# Patient Record
Sex: FEMALE | Race: WHITE | NOT HISPANIC OR LATINO | URBAN - METROPOLITAN AREA
[De-identification: names, ages, dates, MRNs, and addresses within clinical notes are randomized per-mention and may not be internally consistent; named-entity substitution may affect disease eponyms.]

---

## 2017-01-12 DIAGNOSIS — N92.1 BREAKTHROUGH BLEEDING ON BIRTH CONTROL PILLS: Primary | ICD-10-CM

## 2017-01-12 RX ORDER — LEVONORGESTREL/ETHIN.ESTRADIOL 0.1-0.02MG
1 TABLET ORAL DAILY
Qty: 30 TABLET | Refills: 0 | Status: SHIPPED | OUTPATIENT
Start: 2017-01-12 | End: 2024-01-04

## 2017-01-12 NOTE — TELEPHONE ENCOUNTER
Routing refill request to provider for review/approval because:  Patient needs to be seen because it has been more than 1 year since last office visit.  Please see message below.   Teresa Jimenez RN   [facility] - Triage

## 2017-01-12 NOTE — TELEPHONE ENCOUNTER
I let patient know that her Rx for 30 days is at her pharmacy.  No questions.  Helen Boyce RN- Triage [employer]

## 2017-01-12 NOTE — TELEPHONE ENCOUNTER
30 day supply ordered. Please notify her to find a new PCP that her insurance approves to get further refills.    Aldair Adair MD  [facility], [place]

## 2017-01-12 NOTE — TELEPHONE ENCOUNTER
Left non detailed message for patient to return call.  Teresa Jimenez RN   [facility] - Triage

## 2017-01-12 NOTE — TELEPHONE ENCOUNTER
Patient needs by tomorrow or she will be behind on her pills (pharmacy pended)  States she has changed insurance and can no longer come here  Patient was informed that she needs to be seen   Please call patient when this /or if this can be refilled   [phone]      levonorgestrel-ethinyl estradiol (AVIANE,ALESSE,LESSINA) 0.1-20 MG-MCG per tablet      Last Written Prescription Date: 12/3/15  Last Fill Quantity: 90,  # refills: 3   Last Office Visit with [facility], P or [facility] prescribing provider: 12/3/15      Darlin GARRISON

## 2017-04-12 ENCOUNTER — TELEPHONE (OUTPATIENT)
Dept: FAMILY MEDICINE | Facility: CLINIC | Age: 32
End: 2017-04-12

## 2017-04-12 NOTE — TELEPHONE ENCOUNTER
Pt is past due for fu pap smear  Reminder letter was sent 3/16/17  Called and spoke to patient who stated she has transferred care outside of [place] due to insurance coverage.  End Tracking  Madiha Smith,   Pap Tracking

## 2017-06-17 ENCOUNTER — HEALTH MAINTENANCE LETTER (OUTPATIENT)
Age: 32
End: 2017-06-17

## 2018-06-23 ENCOUNTER — HEALTH MAINTENANCE LETTER (OUTPATIENT)
Age: 33
End: 2018-06-23

## 2019-12-15 ENCOUNTER — HEALTH MAINTENANCE LETTER (OUTPATIENT)
Age: 34
End: 2019-12-15

## 2021-01-15 ENCOUNTER — HEALTH MAINTENANCE LETTER (OUTPATIENT)
Age: 36
End: 2021-01-15

## 2021-09-04 ENCOUNTER — HEALTH MAINTENANCE LETTER (OUTPATIENT)
Age: 36
End: 2021-09-04

## 2022-02-19 ENCOUNTER — HEALTH MAINTENANCE LETTER (OUTPATIENT)
Age: 37
End: 2022-02-19

## 2022-08-08 ENCOUNTER — TRANSFERRED RECORDS (OUTPATIENT)
Dept: HEALTH INFORMATION MANAGEMENT | Facility: CLINIC | Age: 37
End: 2022-08-08

## 2022-08-26 ENCOUNTER — TRANSFERRED RECORDS (OUTPATIENT)
Dept: HEALTH INFORMATION MANAGEMENT | Facility: CLINIC | Age: 37
End: 2022-08-26

## 2022-10-16 ENCOUNTER — HEALTH MAINTENANCE LETTER (OUTPATIENT)
Age: 37
End: 2022-10-16

## 2023-04-01 ENCOUNTER — HEALTH MAINTENANCE LETTER (OUTPATIENT)
Age: 38
End: 2023-04-01

## 2023-05-02 ENCOUNTER — TRANSFERRED RECORDS (OUTPATIENT)
Dept: HEALTH INFORMATION MANAGEMENT | Facility: CLINIC | Age: 38
End: 2023-05-02

## 2023-05-26 ENCOUNTER — TRANSFERRED RECORDS (OUTPATIENT)
Dept: HEALTH INFORMATION MANAGEMENT | Facility: CLINIC | Age: 38
End: 2023-05-26
Payer: COMMERCIAL

## 2023-06-06 NOTE — TELEPHONE ENCOUNTER
*Records requested based on other chart, merge in progress*  RECORDS RECEIVED FROM:    DATE RECEIVED:    NOTES STATUS DETAILS   OFFICE NOTE from referring provider  N/A    OFFICE NOTE from other cardiologists  Care Everywhere Dr. Main 5-26-23 PN   RECORDS from hospital/ED Care Everywhere 5-2-23 PN   MEDICATION LIST Internal    GENERAL CARDIO RECORDS   (ALL APPOINTMENT TYPES)     LABS (CBC,BMP,CMP, TSH) Internal    EKG (STRIPS & REPORTS) In process 5-26-23   MONITORS (STRIPS & REPORTS) In process 8-26-22   ECHOS (IMAGES AND REPORTS) In process 10-26-21   STRESS TESTS (IMAGES AND REPORTS) N/A    cMRI (IMAGES AND REPORTS) N/A    CT/CTA (IMAGES AND REPORTS) N/A      Action 6/6/23 [facility]  Fax #[phone]   Action Taken Requested:    EKG Strips    Ziopatch   5-26-23, 5-2-23, 8-8-22 8-26-22     Sent to scanning 6/13      Action 6/6/23 PN  Fax #[phone]   Action Taken Requested echo 10-26-21    Confirmed echo is in PACS 6/13

## 2023-06-13 NOTE — PROGRESS NOTES
"  Reason for Visit:  Today I have seen Gardenia Pierre for Bradycardia  Consult: No    HPI : Status / Symptoms / Concerns     37 y.o. f seen by [facility] cardiology clinic for low heart rates. Relatively low heart rates at baseline. Sometimes prodrome that may include nausea before she has bradycardia. Unable to perform ADLs during these episodes may even need to lay down. Sometimes feels extra beats during these episodes. Understandably concerned about this. Recent implantable loop recorder insertion for weakness and bradycardia. Here today for second opinion.    Previous summary \"Episodic weakness, left arm tingling and low energy to perform her usual tasks. COVID in 2022. Ziopatch which showed no significant abnormalities: HR  bpm, sinus rate of 65 bpm. Symptoms with sinus rhythm in the 50s to 100s. PVC burden was <1%. On the occasions that she experiences symptoms with low HRs. It will increase if she gets up and walks around but that does not necessarily abolish her symptoms. No lightheadedness or near-syncope but becomes frightened that she may fall if the weakness worsens, and especially because she cares for four young children and is sometimes carrying one of them.\"     Cardiovascular risk factor profile:   (-) HTN, (-) DM, (-) hypercholesterolemia, (-)  prior tobacco use, (-) fam Hx premature CAD.     No family history of sudden cardiac death    Chest Pain:   No  Shortness of Breath:  No  Ankle Swelling:  No  Muscle Aches:  No  Palpitations:   No    Lightheadedness:  As above  Fainting:   No  Medication Issues:  No  Recent Test:  No    Past Medical History:   Diagnosis Date     Adjustment disorder with anxious mood 3/7/2014     Herpes zoster 3/2009, 2012     HPV in female 12/2015    NIL pap, +HR HPV. Co-test 1 yr     Intestinal disaccharidase deficiencies and disaccharide malabsorption 1995    lactose intolerance - not an issue now     Irritable bowel syndrome 1995     Mild intermittent " asthma     exercise related - resolved with weight loss     Other chronic allergic conjunctivitis      Perennial allergic rhinitis      Presence of (intrauterine) contraceptive device 2/2014    Mirena     Recurrent urticaria     allergy related      Past Surgical History:   Procedure Laterality Date     HC TOOTH EXTRACTION W/FORCEP  12/2008    wisdom teeth extracted     INSERT INTRAUTERINE DEVICE  2/2014    Mirena placed     TONSILLECTOMY  12/2007        Other Systems:  Resp - / GI - /MS - /Edison - /Psy - /Derm - /Hem - / - /Lymph - /ENT -/ Endo -  No other pertinent concern in systems review.     Social History: reports that she has never smoked. She has never used smokeless tobacco. She reports current alcohol use. She reports that she does not use drugs.   I have reviewed this patient's social history and updated it with pertinent information if needed.    Family History   Problem Relation Age of Onset     Breast Cancer Maternal Grandmother         at age 78     Hypertension Mother      Osteoarthritis Mother         planning CANDELARIO     Cerebrovascular Disease Paternal Grandmother      Lipids Father      Depression Mother      Lipids Mother      Cervical Cancer Sister      Medications:  Current Outpatient Medications   Medication     albuterol (PROAIR HFA/PROVENTIL HFA/VENTOLIN HFA) 108 (90 Base) MCG/ACT inhaler     loratadine (CLARITIN) 10 MG tablet     multivitamin w/minerals (THERA-VIT-M) tablet     CETIRIZINE HCL 10 MG OR TABS     levonorgestrel-ethinyl estradiol (AVIANE,ALESSE,LESSINA) 0.1-20 MG-MCG per tablet     magnesium 250 MG tablet     No current facility-administered medications for this visit.        Exam:  BP 94/63 (BP Location: Right arm, Patient Position: Chair, Cuff Size: Adult Large)   Pulse 90   Wt 89.4 kg (197 lb 3.2 oz)   SpO2 95%   BMI 27.70 kg/m     Body mass index is 27.7 kg/m .   General:  Alert, oriented, no acute distress  Eyes:  External exam normal, Conjunctivae noninjected and  nonicteric.  Mouth:  Moist mucosa, restored teeth  Ears:  Hearing grossly intact  Neck:  No thyromegaly. Carotid upstroke normal, no carotid bruit, no JVD  Lungs:  Clear to auscultation. No wheezes, crackles, rales or rhonchi,      no accessory muscle use   Heart:  Regular, normal S1 and S2, no obvious murmurs, no rubs or gallops  Abdomen: Soft, non-tender  Extremities: No ankle edema, age appropriate skin without stasis  Pulses: Pedal 2+ bilaterally  Edison/Psy: Non-focal, normal mood, normal affect      Vital Trend:  Wt Readings from Last 5 Encounters:   06/14/23 89.4 kg (197 lb 3.2 oz)   12/03/15 80.7 kg (178 lb)   02/25/15 76.7 kg (169 lb)   09/11/14 78.5 kg (173 lb)   05/13/14 78.9 kg (174 lb)     BP Readings from Last 5 Encounters:   06/14/23 94/63   12/03/15 110/51   02/25/15 114/65   09/11/14 98/66   05/13/14 98/56     Pulse Readings from Last 5 Encounters:   06/14/23 90   12/03/15 60   02/25/15 52   09/11/14 73   05/13/14 56        Data:     ECG (2023):  Sinus bradycardia   Possible Left atrial enlargement   Incomplete right bundle branch block     Echocardiogram (2021):    No significant valvular abnormalities were identified.  No pathologic basis for murmur identified.  Left ventricular chamber size is normal.  Normal left ventricular wall thickness.  Left ventricular ejection fraction is visually estimated at 60%.  Normal left ventricular diastolic function for age.    CT chest (2017):   PE rule out wnl    Lab Review:  Lab Results   Component Value Date     04/02/2014    HDL 54 04/02/2014       Assessment:     Gardenia Pierre is a 37 year old female with symptomatic intermittent sinusbradycardia. The reason for the low heart rates are the allometric body size resting heart rate relationship (tall humans have lower heart rates) with superimposed intermittent likely vagally mediated bradycardia (pronounced respiratory heart rate variation during exam suggests high vagal input). Below normal heart  rates can lead to a transient intracardiac congestive state that can lead to heart failure like symptoms e.g. fullness in the chest (please see my publications). As she ages the inappropriate bradycardia and associated symptoms will likely resolve. The NTproBNP suggests that the myocardial wall stress is within normal limits and rules out myocardial disease.    We talked about increasing caffeine consumption which will increase the heart rate.. In addition she will get an albuterol inhaler which will allow her to more immediately address episodes of inappropriate sinus bradycardia. I encouraged her to continue her exercise regimen and make sure that she is always well-hydrated.  She can also be liberal about salt consumption.      Plan:     1.  Intermittent symptomatic bradycardia   - Increase caffeine consumption, albuterol inhaler as needed   - NT-proBNP   - Heart healthy lifestyle (good hydration with liberal salt consumption)    Contingency Plan: Cilostazol  Follow-up: 3 months    I spent 60min for the chart preparation, visit and documentation   This note was software transcribed.

## 2023-06-14 ENCOUNTER — ANCILLARY PROCEDURE (OUTPATIENT)
Dept: CARDIOLOGY | Facility: CLINIC | Age: 38
End: 2023-06-14
Attending: INTERNAL MEDICINE
Payer: COMMERCIAL

## 2023-06-14 ENCOUNTER — PRE VISIT (OUTPATIENT)
Dept: CARDIOLOGY | Facility: CLINIC | Age: 38
End: 2023-06-14
Payer: COMMERCIAL

## 2023-06-14 ENCOUNTER — LAB (OUTPATIENT)
Dept: LAB | Facility: CLINIC | Age: 38
End: 2023-06-14
Payer: COMMERCIAL

## 2023-06-14 VITALS
DIASTOLIC BLOOD PRESSURE: 63 MMHG | WEIGHT: 197.2 LBS | BODY MASS INDEX: 27.7 KG/M2 | HEART RATE: 90 BPM | OXYGEN SATURATION: 95 % | SYSTOLIC BLOOD PRESSURE: 94 MMHG

## 2023-06-14 DIAGNOSIS — J45.909 ASTHMA: ICD-10-CM

## 2023-06-14 DIAGNOSIS — R00.1 SINUS BRADYCARDIA: Primary | ICD-10-CM

## 2023-06-14 DIAGNOSIS — Z45.02 FITTING AND ADJUSTMENT OF AUTOMATIC IMPLANTABLE CARDIOVERTER-DEFIBRILLATOR: ICD-10-CM

## 2023-06-14 DIAGNOSIS — Z45.02 FITTING AND ADJUSTMENT OF AUTOMATIC IMPLANTABLE CARDIOVERTER-DEFIBRILLATOR: Primary | ICD-10-CM

## 2023-06-14 LAB
MDC_IDC_MSMT_BATTERY_DTM: NORMAL
MDC_IDC_MSMT_BATTERY_REMAINING_PERCENTAGE: 100 %
MDC_IDC_MSMT_BATTERY_STATUS: NORMAL
MDC_IDC_MSMT_CAP_CHARGE_TYPE: NORMAL
MDC_IDC_PG_IMPLANT_DTM: NORMAL
MDC_IDC_PG_MFG: NORMAL
MDC_IDC_PG_MODEL: NORMAL
MDC_IDC_PG_SERIAL: NORMAL
MDC_IDC_PG_TYPE: NORMAL
MDC_IDC_SESS_CLINIC_NAME: NORMAL
MDC_IDC_SESS_DTM: NORMAL
MDC_IDC_SESS_TYPE: NORMAL
MDC_IDC_SET_LEADCHNL_RA_SENSING_SENSITIVITY: 0.04 MV
MDC_IDC_SET_ZONE_TYPE: NORMAL
MDC_IDC_STAT_AT_BURDEN_PERCENT: 0 %
MDC_IDC_STAT_AT_MODE_SW_COUNT: 0
MDC_IDC_STAT_BRADY_DTM_END: NORMAL
MDC_IDC_STAT_BRADY_DTM_START: NORMAL
MDC_IDC_STAT_EPISODE_RECENT_COUNT: 4
MDC_IDC_STAT_EPISODE_RECENT_COUNT_DTM_END: NORMAL
MDC_IDC_STAT_EPISODE_RECENT_COUNT_DTM_START: NORMAL
MDC_IDC_STAT_EPISODE_TYPE: NORMAL
NT-PROBNP SERPL-MCNC: 94 PG/ML (ref 0–450)

## 2023-06-14 PROCEDURE — 99213 OFFICE O/P EST LOW 20 MIN: CPT | Performed by: INTERNAL MEDICINE

## 2023-06-14 PROCEDURE — 99205 OFFICE O/P NEW HI 60 MIN: CPT | Mod: 25 | Performed by: INTERNAL MEDICINE

## 2023-06-14 PROCEDURE — 93291 INTERROG DEV EVAL SCRMS IP: CPT | Performed by: INTERNAL MEDICINE

## 2023-06-14 PROCEDURE — 83880 ASSAY OF NATRIURETIC PEPTIDE: CPT | Performed by: PATHOLOGY

## 2023-06-14 PROCEDURE — 36415 COLL VENOUS BLD VENIPUNCTURE: CPT | Performed by: PATHOLOGY

## 2023-06-14 RX ORDER — MULTIPLE VITAMINS W/ MINERALS TAB 9MG-400MCG
1 TAB ORAL DAILY
COMMUNITY

## 2023-06-14 RX ORDER — ALBUTEROL SULFATE 90 UG/1
2 AEROSOL, METERED RESPIRATORY (INHALATION) EVERY 6 HOURS PRN
Qty: 18 G | Refills: 11 | Status: SHIPPED | OUTPATIENT
Start: 2023-06-14 | End: 2024-01-04

## 2023-06-14 RX ORDER — LORATADINE 10 MG/1
10 TABLET ORAL DAILY
COMMUNITY
End: 2024-01-04

## 2023-06-14 ASSESSMENT — PAIN SCALES - GENERAL: PAINLEVEL: NO PAIN (0)

## 2023-06-14 NOTE — LETTER
"6/14/2023      RE: Gardenia Orozco  [address] [facility] [address]       Dear Colleague,    Thank you for the opportunity to participate in the care of your patient, Gardenia Orozco, at the [facility] [place] at [facility]. Please see a copy of my visit note below.      Reason for Visit:  Today I have seen Gardenia Pierre for Bradycardia  Consult: No    HPI : Status / Symptoms / Concerns     37 y.o. f seen by [facility] cardiology clinic for low heart rates. Relatively low heart rates at baseline. Sometimes prodrome that may include nausea before she has bradycardia. Unable to perform ADLs during these episodes may even need to lay down. Sometimes feels extra beats during these episodes. Understandably concerned about this. Recent implantable loop recorder insertion for weakness and bradycardia. Here today for second opinion.    Previous summary \"Episodic weakness, left arm tingling and low energy to perform her usual tasks. COVID in 2022. Ziopatch which showed no significant abnormalities: HR  bpm, sinus rate of 65 bpm. Symptoms with sinus rhythm in the 50s to 100s. PVC burden was <1%. On the occasions that she experiences symptoms with low HRs. It will increase if she gets up and walks around but that does not necessarily abolish her symptoms. No lightheadedness or near-syncope but becomes frightened that she may fall if the weakness worsens, and especially because she cares for four young children and is sometimes carrying one of them.\"     Cardiovascular risk factor profile:   (-) HTN, (-) DM, (-) hypercholesterolemia, (-)  prior tobacco use, (-) fam Hx premature CAD.     No family history of sudden cardiac death    Chest Pain:   No  Shortness of Breath:  No  Ankle Swelling:  No  Muscle Aches:  No  Palpitations:   No    Lightheadedness:  As above  Fainting:   No  Medication Issues:  No  Recent Test:  No    Past Medical " History:   Diagnosis Date    Adjustment disorder with anxious mood 3/7/2014    Herpes zoster 3/2009, 2012    HPV in female 12/2015    NIL pap, +HR HPV. Co-test 1 yr    Intestinal disaccharidase deficiencies and disaccharide malabsorption 1995    lactose intolerance - not an issue now    Irritable bowel syndrome 1995    Mild intermittent asthma     exercise related - resolved with weight loss    Other chronic allergic conjunctivitis     Perennial allergic rhinitis     Presence of (intrauterine) contraceptive device 2/2014    Mirena    Recurrent urticaria     allergy related      Past Surgical History:   Procedure Laterality Date    HC TOOTH EXTRACTION W/FORCEP  12/2008    wisdom teeth extracted    INSERT INTRAUTERINE DEVICE  2/2014    Mirena placed    TONSILLECTOMY  12/2007        Other Systems:  Resp - / GI - /MS - /Edison - /Psy - /Derm - /Hem - / - /Lymph - /ENT -/ Endo -  No other pertinent concern in systems review.     Social History: reports that she has never smoked. She has never used smokeless tobacco. She reports current alcohol use. She reports that she does not use drugs.   I have reviewed this patient's social history and updated it with pertinent information if needed.    Family History   Problem Relation Age of Onset    Breast Cancer Maternal Grandmother         at age 78    Hypertension Mother     Osteoarthritis Mother         planning CANDELARIO    Cerebrovascular Disease Paternal Grandmother     Lipids Father     Depression Mother     Lipids Mother     Cervical Cancer Sister      Medications:  Current Outpatient Medications   Medication    albuterol (PROAIR HFA/PROVENTIL HFA/VENTOLIN HFA) 108 (90 Base) MCG/ACT inhaler    loratadine (CLARITIN) 10 MG tablet    multivitamin w/minerals (THERA-VIT-M) tablet    CETIRIZINE HCL 10 MG OR TABS    levonorgestrel-ethinyl estradiol (AVIANE,ALESSE,LESSINA) 0.1-20 MG-MCG per tablet    magnesium 250 MG tablet     No current facility-administered medications for this  visit.        Exam:  BP 94/63 (BP Location: Right arm, Patient Position: Chair, Cuff Size: Adult Large)   Pulse 90   Wt 89.4 kg (197 lb 3.2 oz)   SpO2 95%   BMI 27.70 kg/m     Body mass index is 27.7 kg/m .   General:  Alert, oriented, no acute distress  Eyes:  External exam normal, Conjunctivae noninjected and nonicteric.  Mouth:  Moist mucosa, restored teeth  Ears:  Hearing grossly intact  Neck:  No thyromegaly. Carotid upstroke normal, no carotid bruit, no JVD  Lungs:  Clear to auscultation. No wheezes, crackles, rales or rhonchi,      no accessory muscle use   Heart:  Regular, normal S1 and S2, no obvious murmurs, no rubs or gallops  Abdomen: Soft, non-tender  Extremities: No ankle edema, age appropriate skin without stasis  Pulses: Pedal 2+ bilaterally  Edison/Psy: Non-focal, normal mood, normal affect      Vital Trend:  Wt Readings from Last 5 Encounters:   06/14/23 89.4 kg (197 lb 3.2 oz)   12/03/15 80.7 kg (178 lb)   02/25/15 76.7 kg (169 lb)   09/11/14 78.5 kg (173 lb)   05/13/14 78.9 kg (174 lb)     BP Readings from Last 5 Encounters:   06/14/23 94/63   12/03/15 110/51   02/25/15 114/65   09/11/14 98/66   05/13/14 98/56     Pulse Readings from Last 5 Encounters:   06/14/23 90   12/03/15 60   02/25/15 52   09/11/14 73   05/13/14 56        Data:     ECG (2023):  Sinus bradycardia   Possible Left atrial enlargement   Incomplete right bundle branch block     Echocardiogram (2021):    No significant valvular abnormalities were identified.  No pathologic basis for murmur identified.  Left ventricular chamber size is normal.  Normal left ventricular wall thickness.  Left ventricular ejection fraction is visually estimated at 60%.  Normal left ventricular diastolic function for age.    CT chest (2017):   PE rule out wnl    Lab Review:  Lab Results   Component Value Date     04/02/2014    HDL 54 04/02/2014       Assessment:     Gardenia Pierre is a 37 year old female with symptomatic intermittent  sinusbradycardia. The reason for the low heart rates are the allometric body size resting heart rate relationship (tall humans have lower heart rates) with superimposed intermittent likely vagally mediated bradycardia (pronounced respiratory heart rate variation during exam suggests high vagal input). Below normal heart rates can lead to a transient intracardiac congestive state that can lead to heart failure like symptoms e.g. fullness in the chest (please see my publications). As she ages the inappropriate bradycardia and associated symptoms will likely resolve. The NTproBNP suggests that the myocardial wall stress is within normal limits and rules out myocardial disease.    We talked about increasing caffeine consumption which will increase the heart rate.. In addition she will get an albuterol inhaler which will allow her to more immediately address episodes of inappropriate sinus bradycardia. I encouraged her to continue her exercise regimen and make sure that she is always well-hydrated.  She can also be liberal about salt consumption.      Plan:     1.  Intermittent symptomatic bradycardia   - Increase caffeine consumption, albuterol inhaler as needed   - NT-proBNP   - Heart healthy lifestyle (good hydration with liberal salt consumption)    Contingency Plan: Cilostazol  Follow-up: 3 months    I spent 60min for the chart preparation, visit and documentation   This note was software transcribed.          Please do not hesitate to contact me if you have any questions/concerns.     Sincerely,     Allen Valadez MD

## 2023-06-14 NOTE — PATIENT INSTRUCTIONS
It was a pleasure to see you in clinic today, we are happy to have you as a patient!  Please do not hesitate to call us with any questions or concerns. We will transfer your phone JANESSA to send the data to [facility] cardiology-device clinic from [place].  Your first automatic routine remote check will be scheduled for 9/14/2023, the results will be available on My Chart.  If we see anything concerning come in from your [employer] JANESSA we will call you!  Thank you for coming to clinic today.    Anum Kaur RN    Electrophysiology Nurse Clinician  [facility]    During Business Hours Please Call:  [phone]  After Hours Please Call:  [phone] - select option #4 and ask for job code [phone]

## 2023-06-14 NOTE — PATIENT INSTRUCTIONS
Dr. Valadez recommends:    Albuterol inhaler as needed.    Follow up clinic visit with Dr. Valadez in 3 months. No labs needed.    Thank you for your visit today.  Please call me with any questions or concerns.   Lenny Briseno RN  Cardiology Care Coordinator  [phone]

## 2023-06-14 NOTE — NURSING NOTE
Chief Complaint   Patient presents with     New Patient     6/14/23--Dr. Valadez: bradycardia     Vitals were taken and medications reconciled.    Narciso Woods, EMT  10:07 AM

## 2023-09-12 NOTE — PROGRESS NOTES
Reason for Visit:  Today I have seen Gardenia I Ernesto for Bradycardia  Consult: No     HPI : Status / Symptoms / Concerns     37 y.o. f with physiologically low heart rates. Sometimes prodrome that may include nausea with more pronounced bradycardia. Unable to perform ADLs during these episodes may even need to lay down. Last time we talked about increasing caffeine consumption which will increase the heart rate. In addition she got an albuterol inhaler which will allow her to more immediately address episodes of inappropriate sinus bradycardia. I had encouraged her to continue her exercise regimen and make sure that she is always well-hydrated. [place] about salt consumption.    Today patient reports feeling much better. She states having less episodes of pronounced bradycardia. No chest pain or SOB. She didn't try the albuterol inhaler as she forgot to  medication.      Cardiovascular risk factor profile:   (-) HTN, (-) DM, (-) hypercholesterolemia, (-)  prior tobacco use, (-) fam Hx premature CAD.        Chest Pain:                 No  Shortness of Breath:   No  Ankle Swelling:           No  Muscle Aches:            No  Palpitations:                No                      Lightheadedness:       As above  Fainting:                      No  Medication Issues:      No  Recent Test:                No    Past Medical History:   Diagnosis Date    Adjustment disorder with anxious mood 3/7/2014    Herpes zoster 3/2009, 2012    HPV in female 12/2015    NIL pap, +HR HPV. Co-test 1 yr    Intestinal disaccharidase deficiencies and disaccharide malabsorption 1995    lactose intolerance - not an issue now    Irritable bowel syndrome 1995    Mild intermittent asthma     exercise related - resolved with weight loss    Other chronic allergic conjunctivitis     Perennial allergic rhinitis     Presence of (intrauterine) contraceptive device 2/2014    Mirena    Recurrent urticaria     allergy related      Past Surgical  History:   Procedure Laterality Date    HC TOOTH EXTRACTION W/FORCEP  2008    wisdom teeth extracted    INSERT INTRAUTERINE DEVICE  2014    Mirena placed    TONSILLECTOMY  2007        Other Systems:  Resp - / GI - /MS - /Edison - /Psy - /Derm - /Hem - / - /Lymph - /ENT -/ Endo -  No other pertinent concern in systems review.     Social History: reports that she has never smoked. She has never used smokeless tobacco. She reports current alcohol use. She reports that she does not use drugs.   I have reviewed this patient's social history and updated it with pertinent information if needed.    Family History:  She indicated that her mother is alive. She indicated that her father is alive. She indicated that only one of her two sisters is alive. She indicated that her maternal grandmother is alive. She indicated that her maternal grandfather is . She indicated that her paternal grandmother is . She indicated that her paternal grandfather is .     Family History   Problem Relation Age of Onset    Breast Cancer Maternal Grandmother         at age 78    Hypertension Mother     Osteoarthritis Mother         planning CANDELARIO    Cerebrovascular Disease Paternal Grandmother     Lipids Father     Depression Mother     Lipids Mother     Cervical Cancer Sister      No family history of sudden cardiac death    Medications:  Current Outpatient Medications   Medication    multivitamin w/minerals (THERA-VIT-M) tablet    albuterol (PROAIR HFA/PROVENTIL HFA/VENTOLIN HFA) 108 (90 Base) MCG/ACT inhaler    CETIRIZINE HCL 10 MG OR TABS    levonorgestrel-ethinyl estradiol (AVIANE,ALESSE,LESSINA) 0.1-20 MG-MCG per tablet    loratadine (CLARITIN) 10 MG tablet    magnesium 250 MG tablet     No current facility-administered medications for this visit.        Exam:  /69 (BP Location: Right arm, Patient Position: Chair, Cuff Size: Adult Regular)   Pulse 70   Wt 88.7 kg (195 lb 9.6 oz)   SpO2 98%   BMI 27.47  kg/m     Body mass index is 27.47 kg/m .   General:  Alert, oriented, no acute distress, normal chair rise, walking not impaired   Eyes:  External exam normal, Conjunctivae noninjected and nonicteric.  Mouth:  Moist mucosa, restored teeth  Ears:  Hearing grossly intact  Neck:  No thyromegaly. Carotid upstroke normal, no carotid bruit, no JVD  Lungs:  Clear to auscultation. No wheezes, crackles, rales or rhonchi,      no accessory muscle use   Heart:  Regular, normal S1 and S2, no obvious murmurs, no rubs or gallops  Abdomen: Soft, non-tender  Extremities: No ankle edema, age appropriate skin without stasis  Pulses: Pedal 2+ bilaterally  Edison/Psy: Non-focal, normal mood, normal affect      Vital Trend:  Wt Readings from Last 5 Encounters:   09/13/23 88.7 kg (195 lb 9.6 oz)   06/14/23 89.4 kg (197 lb 3.2 oz)   12/03/15 80.7 kg (178 lb)   02/25/15 76.7 kg (169 lb)   09/11/14 78.5 kg (173 lb)     BP Readings from Last 5 Encounters:   09/13/23 108/69   06/14/23 94/63   12/03/15 110/51   02/25/15 114/65   09/11/14 98/66     Pulse Readings from Last 5 Encounters:   09/13/23 70   06/14/23 90   12/03/15 60   02/25/15 52   09/11/14 73        Data:     ECG (2023):  Sinus bradycardia   Possible Left atrial enlargement   Incomplete right bundle branch block     Echocardiogram (2021):    No significant valvular abnormalities were identified.  No pathologic basis for murmur identified.  Left ventricular chamber size is normal.  Normal left ventricular wall thickness.  Left ventricular ejection fraction is visually estimated at 60%.  Normal left ventricular diastolic function for age.     CT chest (2017):   PE rule out wnl    Lab Review:  Lab Results   Component Value Date     04/02/2014    HDL 54 04/02/2014         Assessment:     Gardenia Orozco is a 37 year old female with symptomatic intermittent sinusbradycardia. In part due to body height - heart rate relationship and likely vagally mediated bradycardia with transient  intracardiac congestive state that can lead to heart failure like symptoms e.g. fullness in the chest. With aging the inappropriate bradycardia and associated symptoms will likely resolve. The NTproBNP suggests that the myocardial wall stress is within normal limits and rules out myocardial disease.    I again suggested to stay well-hydrated with liberal salt consumption.  She is considering to  the albuterol inhaler if she has symptomatic bradycardia.         Plan:     1.  Intermittent symptomatic bradycardia   - Increase caffeine consumption, albuterol inhaler as needed   - Heart healthy lifestyle (good hydration with liberal salt consumption)     Contingency Plan: Cilostazol  Follow-up: As needed     I spent 30min for the chart preparation, visit and documentation   This note was software transcribed.

## 2023-09-13 ENCOUNTER — OFFICE VISIT (OUTPATIENT)
Dept: CARDIOLOGY | Facility: CLINIC | Age: 38
End: 2023-09-13
Attending: INTERNAL MEDICINE
Payer: COMMERCIAL

## 2023-09-13 VITALS
WEIGHT: 195.6 LBS | BODY MASS INDEX: 27.47 KG/M2 | DIASTOLIC BLOOD PRESSURE: 69 MMHG | SYSTOLIC BLOOD PRESSURE: 108 MMHG | HEART RATE: 70 BPM | OXYGEN SATURATION: 98 %

## 2023-09-13 DIAGNOSIS — R00.1 SINUS BRADYCARDIA: ICD-10-CM

## 2023-09-13 PROCEDURE — 99213 OFFICE O/P EST LOW 20 MIN: CPT | Performed by: INTERNAL MEDICINE

## 2023-09-13 PROCEDURE — 99214 OFFICE O/P EST MOD 30 MIN: CPT | Performed by: INTERNAL MEDICINE

## 2023-09-13 ASSESSMENT — PAIN SCALES - GENERAL: PAINLEVEL: NO PAIN (0)

## 2023-09-13 NOTE — PATIENT INSTRUCTIONS
Dr. Valadez recommends:    Follow up as needed.    Thank you for your visit today.  Please call me with any questions or concerns.   Lenny Briseno RN  Cardiology Care Coordinator  [phone]

## 2023-09-13 NOTE — LETTER
9/13/2023      RE: Gardenia Orozco  [address] [facility] [address]       Dear Colleague,    Thank you for the opportunity to participate in the care of your patient, Gardenia Orozco, at the [facility] [place] [facility]. Please see a copy of my visit note below.    Reason for Visit:  Today I have seen Gardenia Orozco for Bradycardia  Consult: No     HPI : Status / Symptoms / Concerns     37 y.o. f with physiologically low heart rates. Sometimes prodrome that may include nausea with more pronounced bradycardia. Unable to perform ADLs during these episodes may even need to lay down. Last time we talked about increasing caffeine consumption which will increase the heart rate. In addition she got an albuterol inhaler which will allow her to more immediately address episodes of inappropriate sinus bradycardia. I had encouraged her to continue her exercise regimen and make sure that she is always well-hydrated. [place] about salt consumption.    Today patient reports feeling much better. She states having less episodes of pronounced bradycardia. No chest pain or SOB. She didn't try the albuterol inhaler as she forgot to  medication.      Cardiovascular risk factor profile:   (-) HTN, (-) DM, (-) hypercholesterolemia, (-)  prior tobacco use, (-) fam Hx premature CAD.        Chest Pain:                 No  Shortness of Breath:   No  Ankle Swelling:           No  Muscle Aches:            No  Palpitations:                No                      Lightheadedness:       As above  Fainting:                      No  Medication Issues:      No  Recent Test:                No    Past Medical History:   Diagnosis Date    Adjustment disorder with anxious mood 3/7/2014    Herpes zoster 3/2009, 2012    HPV in female 12/2015    NIL pap, +HR HPV. Co-test 1 yr    Intestinal disaccharidase deficiencies and disaccharide malabsorption 1995    lactose  intolerance - not an issue now    Irritable bowel syndrome     Mild intermittent asthma     exercise related - resolved with weight loss    Other chronic allergic conjunctivitis     Perennial allergic rhinitis     Presence of (intrauterine) contraceptive device 2014    Mirena    Recurrent urticaria     allergy related      Past Surgical History:   Procedure Laterality Date    HC TOOTH EXTRACTION W/FORCEP  2008    wisdom teeth extracted    INSERT INTRAUTERINE DEVICE  2014    Mirena placed    TONSILLECTOMY  2007        Other Systems:  Resp - / GI - /MS - /Edison - /Psy - /Derm - /Hem - / - /Lymph - /ENT -/ Endo -  No other pertinent concern in systems review.     Social History: reports that she has never smoked. She has never used smokeless tobacco. She reports current alcohol use. She reports that she does not use drugs.   I have reviewed this patient's social history and updated it with pertinent information if needed.    Family History:  She indicated that her mother is alive. She indicated that her father is alive. She indicated that only one of her two sisters is alive. She indicated that her maternal grandmother is alive. She indicated that her maternal grandfather is . She indicated that her paternal grandmother is . She indicated that her paternal grandfather is .     Family History   Problem Relation Age of Onset    Breast Cancer Maternal Grandmother         at age 78    Hypertension Mother     Osteoarthritis Mother         planning CANDELARIO    Cerebrovascular Disease Paternal Grandmother     Lipids Father     Depression Mother     Lipids Mother     Cervical Cancer Sister      No family history of sudden cardiac death    Medications:  Current Outpatient Medications   Medication    multivitamin w/minerals (THERA-VIT-M) tablet    albuterol (PROAIR HFA/PROVENTIL HFA/VENTOLIN HFA) 108 (90 Base) MCG/ACT inhaler    CETIRIZINE HCL 10 MG OR TABS    levonorgestrel-ethinyl estradiol  (ELIGIO,DENNIS,LESSINA) 0.1-20 MG-MCG per tablet    loratadine (CLARITIN) 10 MG tablet    magnesium 250 MG tablet     No current facility-administered medications for this visit.        Exam:  /69 (BP Location: Right arm, Patient Position: Chair, Cuff Size: Adult Regular)   Pulse 70   Wt 88.7 kg (195 lb 9.6 oz)   SpO2 98%   BMI 27.47 kg/m     Body mass index is 27.47 kg/m .   General:  Alert, oriented, no acute distress, normal chair rise, walking not impaired   Eyes:  External exam normal, Conjunctivae noninjected and nonicteric.  Mouth:  Moist mucosa, restored teeth  Ears:  Hearing grossly intact  Neck:  No thyromegaly. Carotid upstroke normal, no carotid bruit, no JVD  Lungs:  Clear to auscultation. No wheezes, crackles, rales or rhonchi,      no accessory muscle use   Heart:  Regular, normal S1 and S2, no obvious murmurs, no rubs or gallops  Abdomen: Soft, non-tender  Extremities: No ankle edema, age appropriate skin without stasis  Pulses: Pedal 2+ bilaterally  [place]/Psy: Non-focal, normal mood, normal affect      Vital Trend:  Wt Readings from Last 5 Encounters:   09/13/23 88.7 kg (195 lb 9.6 oz)   06/14/23 89.4 kg (197 lb 3.2 oz)   12/03/15 80.7 kg (178 lb)   02/25/15 76.7 kg (169 lb)   09/11/14 78.5 kg (173 lb)     BP Readings from Last 5 Encounters:   09/13/23 108/69   06/14/23 94/63   12/03/15 110/51   02/25/15 114/65   09/11/14 98/66     Pulse Readings from Last 5 Encounters:   09/13/23 70   06/14/23 90   12/03/15 60   02/25/15 52   09/11/14 73        Data:     ECG (2023):  Sinus bradycardia   Possible Left atrial enlargement   Incomplete right bundle branch block     Echocardiogram (2021):    No significant valvular abnormalities were identified.  No pathologic basis for murmur identified.  Left ventricular chamber size is normal.  Normal left ventricular wall thickness.  Left ventricular ejection fraction is visually estimated at 60%.  Normal left ventricular diastolic function for age.     CT  chest (2017):   PE rule out wnl    Lab Review:  Lab Results   Component Value Date     04/02/2014    HDL 54 04/02/2014         Assessment:     Gardenia Orozco is a 37 year old female with symptomatic intermittent sinusbradycardia. In part due to body height - heart rate relationship and likely vagally mediated bradycardia with transient intracardiac congestive state that can lead to heart failure like symptoms e.g. fullness in the chest. With aging the inappropriate bradycardia and associated symptoms will likely resolve. The NTproBNP suggests that the myocardial wall stress is within normal limits and rules out myocardial disease.    I again suggested to stay well-hydrated with liberal salt consumption.  She is considering to  the albuterol inhaler if she has symptomatic bradycardia.         Plan:     1.  Intermittent symptomatic bradycardia   - Increase caffeine consumption, albuterol inhaler as needed   - Heart healthy lifestyle (good hydration with liberal salt consumption)     Contingency Plan: Cilostazol  Follow-up: As needed     I spent 30min for the chart preparation, visit and documentation   This note was software transcribed.         Please do not hesitate to contact me if you have any questions/concerns.     Sincerely,     Allen Valadez MD

## 2023-09-13 NOTE — NURSING NOTE
Chief Complaint   Patient presents with    Follow Up     9/13/23--Dr. Valadez: bradycardia     Vitals were taken and medications reconciled.    Narciso Woods, EMT  7:37 AM

## 2023-09-15 ENCOUNTER — ANCILLARY PROCEDURE (OUTPATIENT)
Dept: CARDIOLOGY | Facility: CLINIC | Age: 38
End: 2023-09-15
Attending: INTERNAL MEDICINE
Payer: COMMERCIAL

## 2023-09-15 PROCEDURE — 93297 REM INTERROG DEV EVAL ICPMS: CPT | Performed by: INTERNAL MEDICINE

## 2023-09-15 PROCEDURE — G2066 INTER DEVC REMOTE 30D: HCPCS

## 2023-12-14 ENCOUNTER — ANCILLARY PROCEDURE (OUTPATIENT)
Dept: CARDIOLOGY | Facility: CLINIC | Age: 38
End: 2023-12-14
Attending: INTERNAL MEDICINE
Payer: COMMERCIAL

## 2023-12-14 DIAGNOSIS — Z45.02 FITTING AND ADJUSTMENT OF AUTOMATIC IMPLANTABLE CARDIOVERTER-DEFIBRILLATOR: ICD-10-CM

## 2023-12-26 ENCOUNTER — ANCILLARY PROCEDURE (OUTPATIENT)
Dept: CARDIOLOGY | Facility: CLINIC | Age: 38
End: 2023-12-26
Attending: INTERNAL MEDICINE
Payer: COMMERCIAL

## 2023-12-26 DIAGNOSIS — Z45.02 FITTING AND ADJUSTMENT OF AUTOMATIC IMPLANTABLE CARDIOVERTER-DEFIBRILLATOR: ICD-10-CM

## 2023-12-26 PROCEDURE — 93298 REM INTERROG DEV EVAL SCRMS: CPT | Performed by: INTERNAL MEDICINE

## 2023-12-26 PROCEDURE — G2066 INTER DEVC REMOTE 30D: HCPCS

## 2024-01-02 NOTE — PROGRESS NOTES
I am delighted to see Gardenia Orozco as a new patient in cardiology clinic for evaluation of bradycardia.    History of Present Illness:  The patient is a 38 year old  female who was referred by Dr. Valadez for symptomatic intermittent bradycardia.    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 She also works out 5 days a week. Her resting heart rate is normally in the 40s and has been so since she was young. She has never had any issues until after she contracted COVID in August 2022. Since then she's had spells every few months, characterized by a sudden feeling of sudden fatigue, feeling like her body is powering down, frequently with nausea but not always. She had gone to ED first few times and was told her heart rate was in the high 30s-low 40s. She had an ambulatory   monitor which showed an average HR of 65 bpm, with max sinus rate up to 159 bpm. She saw Dr. Main at [facility] whose impression was that primary sinus node dysfunction is extremely unlikely, and that it is unclear whether bradycardia was primary driving factor of her symptoms. He implanted a loop recorder to assess causality.  She has since followed with Dr. Valadez.    She estimates a total of 8 spells since 8/2022, no actual loss of consciousness. Her most recent episode was 12/26/2023 - prior to that she had no symptoms for 6 months. She had a normal Christmas day; she normally doesn't drink much alcohol and on [place] had a glass of Prosecco. On 12/26/2023 she woke up feeling nauseated - she was able to eat and keep food down, no vomiting but some dry heaves. 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 Around 1pm she started feeling poorly - more nausea, dizzy, fatigue. She tried to help  with kids but did not feel well so went to bed. Her watch was notifying her constantly that her  HR was low, so she called device clinic and her loop recorder was evaluated remotely, her HR were varying between 30-50s. She did check her BP at home and it was her usually SBP of ~ 110 mmHg. She ended up falling asleep and woke up the next day feeling better. No episodes since.    As stated above she does exercise 5 days a week and on those days her baseline heart rate seems to be higher. Her episodes of sudden fatigue can occur at rest as well, frequently but not always associated with nausea; she usually felt better after sleeping.    She drinks about  oz of water a day, very little caffeine use, no smoking, rare alcohol. No family history of significant CAD. Her  recently had an MI so she is understandably more attuned to cardiac issues since.      Past Medical History:  ILR ([employer]) implanted 2023 at [facility]     Medications:   None      Allergies:    Allergies   Allergen Reactions    Penicillins          Physical examination  Vitals: 112/71, HR 55  BMI= 27    Constitutional: In general, the patient is a pleasant female in no acute distress.    Cardiovascular: Carotids +2/2 bilaterally without bruits.  No jugular venous distension. Regular rate and rhythm. Normal S1, S2. No murmur, rub, click, or gallop.   Extremities: Pulses are normal bilaterally throughout. No peripheral edema.  Respiratory: Clear to asculation.  No ronchi, wheezes, rales.  No dullness to percussion.     I have personally and independently reviewed the following:  Labs:   2023: K 3.8, cr 0.84, hgb 12.8, plt 235K, TSH 0.91    Echo 10/26/2021: EF 60%, no valve disease    EK2023: sinus 49 bpm, RsR' V1-2    Patch monitor -2022:  Sinus average 65, range 35 (1am) to 159 bpm  PACs/PVCs <1%  Patient triggered events correlated to sinus rhythm 50s - 90s    Device interrogation 2023 after patient reported an episode of vomiting, Apple watch notifying her that HRs < 40 bpm for at least 10  minutes:  Presenting rhythm was sinus 44-51 bpm  Patient activated recording 12/26/2023 showed sinus rhythm 34-56 bpm      Assessment :  Intermittent sinus bradycardia, suspect vagally mediated. Episodes started after COVID and seem to be decreasing in frequency. She has accompanying symptoms of fatigue, nausea which usually precede notifications of bradycardia. Her baseline HR is 40-50 likely due to her very active lifestyle; any vagal input drops her heart rate below 40 and she does become symptomatic. She does not have any sinus node dysfunction as she is able to increase heart rate to nearly 160 with exercise. Her BP during these episodes are unchanged. Offered reassurance. Discussed preventative measures such as making sure she gets 120 oz water a day, adding liquid IV 1-2 times a day, continue to exercise. When she has symptoms she should rest, drink additional water (when she did go to ED she would get IVF and feel better), if BP is stable no need to go to ED.       Plan:  As above.  Will continue to monitor ILR.          I spent a total of 30 minutes face to face with  Gardenia Orozco during today's office visit. I have spend an additional 30 minutes today on chart review and documentation.      The patient is to return as above . The patient understood the treatment plan as outlined above.  There were no barriers to learning.      Nazia Cruz MD

## 2024-01-04 ENCOUNTER — OFFICE VISIT (OUTPATIENT)
Dept: CARDIOLOGY | Facility: CLINIC | Age: 39
End: 2024-01-04
Attending: INTERNAL MEDICINE
Payer: COMMERCIAL

## 2024-01-04 VITALS
DIASTOLIC BLOOD PRESSURE: 71 MMHG | BODY MASS INDEX: 28.03 KG/M2 | HEART RATE: 55 BPM | SYSTOLIC BLOOD PRESSURE: 112 MMHG | OXYGEN SATURATION: 94 % | WEIGHT: 200.2 LBS | HEIGHT: 71 IN

## 2024-01-04 DIAGNOSIS — R00.1 VAGAL BRADYCARDIA: Primary | ICD-10-CM

## 2024-01-04 PROCEDURE — 99214 OFFICE O/P EST MOD 30 MIN: CPT | Performed by: INTERNAL MEDICINE

## 2024-01-04 PROCEDURE — 99215 OFFICE O/P EST HI 40 MIN: CPT | Performed by: INTERNAL MEDICINE

## 2024-01-04 ASSESSMENT — PAIN SCALES - GENERAL: PAINLEVEL: NO PAIN (0)

## 2024-01-04 NOTE — LETTER
1/4/2024      RE: Gardenia Orozco  [address] [facility] [address]       Dear Colleague,    Thank you for the opportunity to participate in the care of your patient, Gardenia Orozco, at the [facility] [place] [facility]. Please see a copy of my visit note below.    I am delighted to see Gardenia Orozco as a new patient in cardiology clinic for evaluation of bradycardia.    History of Present Illness:  The patient is a 38 year old  female who was referred by Dr. Valadez for symptomatic intermittent bradycardia.    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 She also works out 5 days a week. Her resting heart rate is normally in the 40s and has been so since she was young. She has never had any issues until after she contracted COVID in August 2022. Since then she's had spells every few months, characterized by a sudden feeling of sudden fatigue, feeling like her body is powering down, frequently with nausea but not always. She had gone to ED first few times and was told her heart rate was in the high 30s-low 40s. She had an ambulatory   monitor which showed an average HR of 65 bpm, with max sinus rate up to 159 bpm. She saw Dr. Main at [facility] whose impression was that primary sinus node dysfunction is extremely unlikely, and that it is unclear whether bradycardia was primary driving factor of her symptoms. He implanted a loop recorder to assess causality.  She has since followed with Dr. Valadez.    She estimates a total of 8 spells since 8/2022, no actual loss of consciousness. Her most recent episode was 12/26/2023 - prior to that she had no symptoms for 6 months. She had a normal Christmas day; she normally doesn't drink much alcohol and on [place] had a glass of Prosecco. On 12/26/2023 she woke up feeling nauseated - she was able to eat and keep food down, no vomiting but some dry heaves. 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 Around 1pm she started feeling poorly - more nausea, dizzy, fatigue. She tried to help  with kids but did not feel well so went to bed. Her watch was notifying her constantly that her HR was low, so she called device clinic and her loop recorder was evaluated remotely, her HR were varying between 30-50s. She did check her BP at home and it was her usually SBP of ~ 110 mmHg. She ended up falling asleep and woke up the next day feeling better. No episodes since.    As stated above she does exercise 5 days a week and on those days her baseline heart rate seems to be higher. Her episodes of sudden fatigue can occur at rest as well, frequently but not always associated with nausea; she usually felt better after sleeping.    She drinks about  oz of water a day, very little caffeine use, no smoking, rare alcohol. No family history of significant CAD. Her  recently had an MI so she is understandably more attuned to cardiac issues since.      Past Medical History:  ILR ([employer]) implanted 5/31/2023 at [facility]     Medications:   None      Allergies:    Allergies   Allergen Reactions     Penicillins          Physical examination  Vitals: 112/71, HR 55  BMI= 27    Constitutional: In general, the patient is a pleasant female in no acute distress.    Cardiovascular: Carotids +2/2 bilaterally without bruits.  No jugular venous distension. Regular rate and rhythm. Normal S1, S2. No murmur, rub, click, or gallop.   Extremities: Pulses are normal bilaterally throughout. No peripheral edema.  Respiratory: Clear to asculation.  No ronchi, wheezes, rales.  No dullness to percussion.     I have personally and independently reviewed the following:  Labs:   5/2/2023: K 3.8, cr 0.84, hgb 12.8, plt 235K, TSH 0.91    Echo 10/26/2021: EF 60%, no valve  disease    EK2023: sinus 49 bpm, RsR' V1-2    Patch monitor -2022:  Sinus average 65, range 35 (1am) to 159 bpm  PACs/PVCs <1%  Patient triggered events correlated to sinus rhythm 50s - 90s    Device interrogation 2023 after patient reported an episode of vomiting, Apple watch notifying her that HRs < 40 bpm for at least 10 minutes:  Presenting rhythm was sinus 44-51 bpm  Patient activated recording 2023 showed sinus rhythm 34-56 bpm      Assessment :  Intermittent sinus bradycardia, suspect vagally mediated. Episodes started after COVID and seem to be decreasing in frequency. She has accompanying symptoms of fatigue, nausea which usually precede notifications of bradycardia. Her baseline HR is 40-50 likely due to her very active lifestyle; any vagal input drops her heart rate below 40 and she does become symptomatic. She does not have any sinus node dysfunction as she is able to increase heart rate to nearly 160 with exercise. Her BP during these episodes are unchanged. Offered reassurance. Discussed preventative measures such as making sure she gets 120 oz water a day, adding liquid IV 1-2 times a day, continue to exercise. When she has symptoms she should rest, drink additional water (when she did go to ED she would get IVF and feel better), if BP is stable no need to go to ED.       Plan:  As above.  Will continue to monitor ILR.          I spent a total of 30 minutes face to face with  Gardenia Orozco during today's office visit. I have spend an additional 30 minutes today on chart review and documentation.      The patient is to return as above . The patient understood the treatment plan as outlined above.  There were no barriers to learning.      Nazia Cruz MD        Please do not hesitate to contact me if you have any questions/concerns.     Sincerely,     Nazia Cruz MD

## 2024-01-04 NOTE — NURSING NOTE
Chief Complaint   Patient presents with    New Patient     NEW- bradycardia / ILR       Vitals were taken, medications reconciled.    Tran Berger, Facilitator   8:37 AM

## 2024-01-04 NOTE — PATIENT INSTRUCTIONS
You were seen in the Electrophysiology Clinic today by: Dr Nazia Cruz    Plan:   Medication Changes:  none    Labs/Tests Needed: none    Follow up Visit: as needed      If you have further questions, please utilize [employer] to contact us.     Your Care Team:    [facility] Cardiology   Telephone Number     Nurse Line  Marita Talbot, RN   Debbie Latham, RN  Efrain Maldonado, PITO   [phone]     For scheduling appointments:   Tere   [phone]   For procedure scheduling:    Rafaela Velazquez     [phone]   For the Device Clinic (Pacemakers, ICDs, Loop Recorders)    During business hours: [phone]  After business hours:   [phone]- select option 4 and ask for job code [id number].       On-call cardiologist for after hours or on weekends:   [phone], option #4, and ask to speak to the on-call cardiologist.     Cardiovascular Clinic:   [address] [place] [address]. [place] [address]      As always, Thank you for trusting us with your health care needs!

## 2024-01-08 LAB
MDC_IDC_PG_IMPLANT_DTM: NORMAL
MDC_IDC_PG_MFG: NORMAL
MDC_IDC_PG_MODEL: NORMAL
MDC_IDC_PG_SERIAL: NORMAL
MDC_IDC_PG_TYPE: NORMAL
MDC_IDC_SESS_CLINIC_NAME: NORMAL
MDC_IDC_SESS_DTM: NORMAL
MDC_IDC_SESS_TYPE: NORMAL

## 2024-02-06 LAB
MDC_IDC_EPISODE_DTM: NORMAL
MDC_IDC_EPISODE_ID: 11
MDC_IDC_EPISODE_TYPE: NORMAL
MDC_IDC_MSMT_BATTERY_STATUS: NORMAL
MDC_IDC_PG_IMPLANT_DTM: NORMAL
MDC_IDC_PG_MFG: NORMAL
MDC_IDC_PG_MODEL: NORMAL
MDC_IDC_PG_SERIAL: NORMAL
MDC_IDC_PG_TYPE: NORMAL
MDC_IDC_SESS_CLINIC_NAME: NORMAL
MDC_IDC_SESS_DTM: NORMAL
MDC_IDC_SESS_TYPE: NORMAL
MDC_IDC_SET_ZONE_DETECTION_BEATS_DENOMINATOR: 16 {BEATS}
MDC_IDC_SET_ZONE_DETECTION_BEATS_DENOMINATOR: 6 {BEATS}
MDC_IDC_SET_ZONE_DETECTION_BEATS_NUMERATOR: 16 {BEATS}
MDC_IDC_SET_ZONE_DETECTION_BEATS_NUMERATOR: 6 {BEATS}
MDC_IDC_SET_ZONE_DETECTION_INTERVAL: 2000 MS
MDC_IDC_SET_ZONE_DETECTION_INTERVAL: 3000 MS
MDC_IDC_SET_ZONE_DETECTION_INTERVAL: 310 MS
MDC_IDC_SET_ZONE_STATUS: NORMAL
MDC_IDC_SET_ZONE_TYPE: NORMAL
MDC_IDC_SET_ZONE_VENDOR_TYPE: NORMAL
MDC_IDC_STAT_AT_BURDEN_PERCENT: 0 %
MDC_IDC_STAT_AT_DTM_END: NORMAL
MDC_IDC_STAT_AT_DTM_START: NORMAL
MDC_IDC_STAT_EPISODE_RECENT_COUNT: 0
MDC_IDC_STAT_EPISODE_RECENT_COUNT: 1
MDC_IDC_STAT_EPISODE_RECENT_COUNT_DTM_END: NORMAL
MDC_IDC_STAT_EPISODE_RECENT_COUNT_DTM_START: NORMAL
MDC_IDC_STAT_EPISODE_TOTAL_COUNT: 0
MDC_IDC_STAT_EPISODE_TOTAL_COUNT: 9
MDC_IDC_STAT_EPISODE_TOTAL_COUNT_DTM_END: NORMAL
MDC_IDC_STAT_EPISODE_TOTAL_COUNT_DTM_START: NORMAL
MDC_IDC_STAT_EPISODE_TYPE: NORMAL

## 2024-03-14 ENCOUNTER — ANCILLARY PROCEDURE (OUTPATIENT)
Dept: CARDIOLOGY | Facility: CLINIC | Age: 39
End: 2024-03-14
Attending: INTERNAL MEDICINE
Payer: COMMERCIAL

## 2024-03-14 DIAGNOSIS — Z45.02 FITTING AND ADJUSTMENT OF AUTOMATIC IMPLANTABLE CARDIOVERTER-DEFIBRILLATOR: ICD-10-CM

## 2024-03-14 PROCEDURE — 93298 REM INTERROG DEV EVAL SCRMS: CPT | Mod: 26 | Performed by: INTERNAL MEDICINE

## 2024-03-14 PROCEDURE — 93298 REM INTERROG DEV EVAL SCRMS: CPT

## 2024-06-13 ENCOUNTER — ANCILLARY PROCEDURE (OUTPATIENT)
Dept: CARDIOLOGY | Facility: CLINIC | Age: 39
End: 2024-06-13
Attending: INTERNAL MEDICINE
Payer: COMMERCIAL

## 2024-06-13 DIAGNOSIS — Z45.02 FITTING AND ADJUSTMENT OF AUTOMATIC IMPLANTABLE CARDIOVERTER-DEFIBRILLATOR: ICD-10-CM

## 2024-06-13 PROCEDURE — 93298 REM INTERROG DEV EVAL SCRMS: CPT | Mod: 26 | Performed by: INTERNAL MEDICINE

## 2024-06-13 PROCEDURE — 93298 REM INTERROG DEV EVAL SCRMS: CPT

## 2024-06-27 LAB
MDC_IDC_PG_IMPLANT_DTM: NORMAL
MDC_IDC_PG_MFG: NORMAL
MDC_IDC_PG_MODEL: NORMAL
MDC_IDC_PG_SERIAL: NORMAL
MDC_IDC_PG_TYPE: NORMAL
MDC_IDC_SESS_CLINIC_NAME: NORMAL
MDC_IDC_SESS_DTM: NORMAL
MDC_IDC_SESS_TYPE: NORMAL
MDC_IDC_STAT_AT_BURDEN_PERCENT: 0 %
MDC_IDC_STAT_EPISODE_RECENT_COUNT: 0
MDC_IDC_STAT_EPISODE_TYPE: NORMAL

## 2024-09-12 ENCOUNTER — ANCILLARY PROCEDURE (OUTPATIENT)
Dept: CARDIOLOGY | Facility: CLINIC | Age: 39
End: 2024-09-12
Attending: INTERNAL MEDICINE
Payer: COMMERCIAL

## 2024-09-12 DIAGNOSIS — Z45.02 FITTING AND ADJUSTMENT OF AUTOMATIC IMPLANTABLE CARDIOVERTER-DEFIBRILLATOR: ICD-10-CM

## 2024-09-12 PROCEDURE — 93298 REM INTERROG DEV EVAL SCRMS: CPT

## 2024-09-12 PROCEDURE — 93298 REM INTERROG DEV EVAL SCRMS: CPT | Mod: 26 | Performed by: INTERNAL MEDICINE

## 2025-01-06 ENCOUNTER — ANCILLARY PROCEDURE (OUTPATIENT)
Dept: CARDIOLOGY | Facility: CLINIC | Age: 40
End: 2025-01-06
Attending: INTERNAL MEDICINE
Payer: COMMERCIAL

## 2025-01-06 PROCEDURE — 93298 REM INTERROG DEV EVAL SCRMS: CPT

## 2025-01-06 PROCEDURE — 93298 REM INTERROG DEV EVAL SCRMS: CPT | Mod: 26 | Performed by: INTERNAL MEDICINE

## 2025-04-08 ENCOUNTER — ANCILLARY PROCEDURE (OUTPATIENT)
Dept: CARDIOLOGY | Facility: CLINIC | Age: 40
End: 2025-04-08
Attending: INTERNAL MEDICINE
Payer: COMMERCIAL

## 2025-04-08 PROCEDURE — 93298 REM INTERROG DEV EVAL SCRMS: CPT

## 2025-04-08 PROCEDURE — 93298 REM INTERROG DEV EVAL SCRMS: CPT | Mod: 26 | Performed by: INTERNAL MEDICINE

## 2025-07-09 ENCOUNTER — ANCILLARY PROCEDURE (OUTPATIENT)
Dept: CARDIOLOGY | Facility: CLINIC | Age: 40
End: 2025-07-09
Attending: INTERNAL MEDICINE
Payer: COMMERCIAL

## 2025-07-09 DIAGNOSIS — Z45.02 FITTING AND ADJUSTMENT OF AUTOMATIC IMPLANTABLE CARDIOVERTER-DEFIBRILLATOR: ICD-10-CM

## 2025-07-09 LAB
MDC_IDC_MSMT_BATTERY_DTM: NORMAL
MDC_IDC_PG_IMPLANT_DTM: NORMAL
MDC_IDC_PG_MFG: NORMAL
MDC_IDC_PG_MODEL: NORMAL
MDC_IDC_PG_SERIAL: NORMAL
MDC_IDC_PG_TYPE: NORMAL
MDC_IDC_SESS_CLINIC_NAME: NORMAL
MDC_IDC_SESS_DTM: NORMAL
MDC_IDC_SESS_TYPE: NORMAL
MDC_IDC_STAT_AT_BURDEN_PERCENT: 0 %
MDC_IDC_STAT_EPISODE_RECENT_COUNT: 0
MDC_IDC_STAT_EPISODE_RECENT_COUNT_DTM_END: NORMAL
MDC_IDC_STAT_EPISODE_RECENT_COUNT_DTM_START: NORMAL
MDC_IDC_STAT_EPISODE_TYPE: NORMAL

## 2025-07-09 PROCEDURE — 93298 REM INTERROG DEV EVAL SCRMS: CPT | Mod: 26 | Performed by: INTERNAL MEDICINE

## 2025-07-09 PROCEDURE — 93298 REM INTERROG DEV EVAL SCRMS: CPT

## 2025-08-04 ENCOUNTER — CARE COORDINATION (OUTPATIENT)
Dept: CARDIOLOGY | Facility: CLINIC | Age: 40
End: 2025-08-04

## 2025-08-04 DIAGNOSIS — Z95.818 IMPLANTABLE LOOP RECORDER PRESENT: Primary | ICD-10-CM

## 2025-08-04 RX ORDER — LIDOCAINE 40 MG/G
CREAM TOPICAL
OUTPATIENT
Start: 2025-08-04

## 2025-08-04 RX ORDER — CLINDAMYCIN PHOSPHATE 900 MG/50ML
900 INJECTION, SOLUTION INTRAVENOUS
OUTPATIENT
Start: 2025-08-04

## 2025-08-04 RX ORDER — SODIUM CHLORIDE 9 MG/ML
INJECTION, SOLUTION INTRAVENOUS CONTINUOUS
OUTPATIENT
Start: 2025-08-04

## 2025-08-07 ENCOUNTER — TELEPHONE (OUTPATIENT)
Dept: CARDIOLOGY | Facility: CLINIC | Age: 40
End: 2025-08-07